# Patient Record
Sex: FEMALE | URBAN - METROPOLITAN AREA
[De-identification: names, ages, dates, MRNs, and addresses within clinical notes are randomized per-mention and may not be internally consistent; named-entity substitution may affect disease eponyms.]

---

## 2023-08-02 ENCOUNTER — APPOINTMENT (RX ONLY)
Dept: URBAN - METROPOLITAN AREA CLINIC 55 | Facility: CLINIC | Age: 23
Setting detail: DERMATOLOGY
End: 2023-08-02

## 2023-08-02 DIAGNOSIS — Z41.9 ENCOUNTER FOR PROCEDURE FOR PURPOSES OTHER THAN REMEDYING HEALTH STATE, UNSPECIFIED: ICD-10-CM

## 2023-08-02 PROCEDURE — ? INVENTORY

## 2023-08-02 PROCEDURE — ? COSMETIC CONSULTATION: GENERAL

## 2023-08-02 PROCEDURE — ? DYSPORT

## 2023-08-02 NOTE — PROCEDURE: DYSPORT
Right Periorbital Skin Units: 0
Show Lateral Platysmal Band Units: Yes
Show Ucl Units: No
Glabellar Complex Units: 40
Forehead Units: 15
Detail Level: Detailed
Additional Area 1 Location: lip
Show Inventory Tab: Show
Consent obtained. Risks include but not limited to lid/brow ptosis, bruising, swelling, diplopia, temporary effect, incomplete chemical denervation.
Dilution (U/0.1 Cc): 10
Post-Care Instructions: Patient instructed to not lie down for 4 hours and limit physical activity for 24 hours.

## 2024-01-30 ENCOUNTER — APPOINTMENT (RX ONLY)
Dept: URBAN - METROPOLITAN AREA CLINIC 55 | Facility: CLINIC | Age: 24
Setting detail: DERMATOLOGY
End: 2024-01-30

## 2024-01-30 DIAGNOSIS — Z41.9 ENCOUNTER FOR PROCEDURE FOR PURPOSES OTHER THAN REMEDYING HEALTH STATE, UNSPECIFIED: ICD-10-CM

## 2024-01-30 PROCEDURE — ? DYSPORT

## 2024-01-30 PROCEDURE — ? INVENTORY

## 2024-01-30 NOTE — PROCEDURE: DYSPORT
Mentalis Units: 0
Show Levator Superior Units: Yes
Additional Area 3 Location: Nasalis
Show Right And Left Brow Units: No
Consent obtained. Risks include but not limited to lid/brow ptosis, bruising, swelling, diplopia, temporary effect, incomplete chemical denervation.
Detail Level: Detailed
Forehead Units: 15
Post-Care Instructions: Patient instructed to not lie down for 4 hours and limit physical activity for 24 hours.
Dilution (U/0.1 Cc): 10
Glabellar Complex Units: 40
Additional Area 1 Location: lip
Show Inventory Tab: Show
Additional Area 2 Location: DLI